# Patient Record
(demographics unavailable — no encounter records)

---

## 2018-03-05 NOTE — ED
Anna NEWBY Gabriel, scribed for Letitia Michel MD on 03/05/18 at 2210 .





Abdominal Pain/Male





- HPI Summary


HPI Summary: 





This patient is a 32 year old M presenting to [facility] with a chief complaint of 

ABD pain that began 10 days ago. The patient rates the pain 6/10 in severity 

and located in his suprapubic region. Patient reports blood in his stool, 

burning before urination, and lower back pain. Patient denies vomiting and 

difficulty moving bowels. Last BM was tonight





- History of Current Complaint


Chief Complaint: EDAbdPain


Stated Complaint: ABD PAIN


Time Seen by Provider: 03/05/18 21:47


Hx Obtained From: Patient


Onset/Duration: Still Present


Timing: Constant


Severity Initially: Moderate


Severity Currently: Moderate


Pain Intensity: 6


Pain Scale Used: 0-10 Numeric


Location: Suprapubic


Radiates: Yes


Radiates to: Back


Associated Signs And Symptoms: Positive: Other - blood in his stool, burning 

before urination, and lower back pain.  Negative: Constipation, Vomiting





- Allergies/Home Medications


Allergies/Adverse Reactions: 


 Allergies











Allergy/AdvReac Type Severity Reaction Status Date / Time


 


No Known Allergies Allergy   Verified 03/05/18 23:02














PMH/Surg Hx/FS Hx/Imm Hx


Endocrine/Hematology History: 


   Denies: Hx Anticoagulant Therapy, Hx Blood Disorders, Hx Bone Marrow Disease


Cardiovascular History: Reports: Hx Hypertension


   Denies: Hx Angioplasty


Respiratory History: 


   Denies: Hx Chronic Obstructive Pulmonary Disease (COPD)


GI History: 


   Denies: Hx Obstructive Bowel


Psychiatric History: Reports: Hx Attention Deficit Hyperactivity Disorder


Infectious Disease History: No


Infectious Disease History: 


   Denies: Traveled Outside the [place] in Last 30 Days





- Family History


Known Family History: Positive: Hypertension


   Negative: Cardiac Disease, Diabetes, Respiratory Disease, Seizure Disorder, 

Blood Disorder





- Social History


Occupation: Student


Alcohol Use: None


Hx Substance Use: No


Substance Use Type: Reports: None


Hx Tobacco Use: No


Smoking Status (MU): Never Smoked Tobacco





Review of Systems


Gastrointestinal: Negative - difficulty moving bowels 


Positive: Abdominal Pain, Other - blood in stool .  Negative: Vomiting


Positive: burning


Positive: Other - lower back pain 


All Other Systems Reviewed And Are Negative: Yes





Physical Exam





- Summary


Physical Exam Summary: 





VITAL SIGNS: Reviewed.


GENERAL:  Patient is a well-developed and nourished MALE who is lying 

comfortable in the stretcher. Patient is not in any acute respiratory distress.


HEAD AND FACE: No signs of trauma. No ecchymosis, hematomas or skull 

depressions. No sinus tenderness.


EYES: PERRLA, EOMI x 2, No injected conjunctiva, no nystagmus.


EARS: Hearing grossly intact. Ear canals and tympanic membranes are within 

normal limits.


MOUTH: Oropharynx within normal limits.


NECK: Supple, trachea is midline, no adenopathy, no JVD, no carotid bruit, no c-

spine tenderness, neck with full ROM.


CHEST: Symmetric, no tenderness at palpation


LUNGS: Clear to auscultation bilaterally. No wheezing or crackles.


CVS: Regular rate and rhythm, S1 and S2 present, no murmurs or gallops 

appreciated.


ABDOMEN: Soft, non-tender. No signs of distention. No rebound no guarding, and 

no masses palpated. Bowel sounds are hyperactive 


EXTREMITIES: FROM in all major joints, no edema, no cyanosis or clubbing.


NEURO: Alert and oriented x 3. No acute neurological deficits. Speech is normal 

and follows commands.


SKIN: Dry and warm





Triage Information Reviewed: Yes


Vital Signs On Initial Exam: 


 Initial Vitals











Temp Pulse Resp BP Pulse Ox


 


 98.6 F   67   18   128/78   100 


 


 03/05/18 21:30  03/05/18 21:30  03/05/18 21:30  03/05/18 21:30  03/05/18 21:30











Vital Signs Reviewed: Yes





Diagnostics





- Vital Signs


 Vital Signs











  Temp Pulse Resp BP Pulse Ox


 


 03/05/18 21:30  98.6 F  67  18  128/78  100














- Laboratory


Result Diagrams: 


 03/05/18 22:20





 03/05/18 22:20


Lab Statement: Any lab studies that have been ordered have been reviewed, and 

results considered in the medical decision making process.





- Radiology


  ** ABD xray


Radiology Interpretation Completed By: ED Physician - stool retention





Abdominal Pain Fem Course/Dx





- Course


Assessment/Plan: This patient is a 32 year old M presenting to [facility] with a 

chief complaint of ABD pain that began 10 days ago. The patient rates the pain 6

/10 in severity and located in his suprapubic region. Patient reports blood in 

his stool, burning before urination, and lower back pain. Patient denies 

vomiting and difficulty moving bowels. Last BM was tonight.  ABD XR reveals, 

stool retention.  Test results with no significant abnormalities. UA and 

bloodwork obtained.  Dx constipation.  Patient will be discharged and follow up 

from PCP.  The patient is agreeable with this plan.





- Diagnoses


Provider Diagnoses: 


 Constipation








Discharge





- Discharge Plan


Condition: Stable


Disposition: HOME


Referrals: 


Melba VICKERS,Sheryl EDMONDSON [Primary Care Provider] - 4 Days


Additional Instructions: 


Increase fiber intake. RETURN TO EMERGENCY DEPARTMENT FOR ANY NEW OR WORSENING 

SYMPTOMS 





The documentation as recorded by the Anna swanson Gabriel accurately reflects 

the service I personally performed and the decisions made by me, Letitia Michel MD.

## 2018-03-06 NOTE — RAD
INDICATION:  Abdominal pain.



COMPARISON:  There are no prior studies available for comparison.



TECHNIQUE: Supine and upright  views of the abdomen were obtained.



FINDINGS: The small bowel and colon appear nondistended. No free intraperitoneal air is

seen. There is a large amount retained stool present.



No abnormal calcifications are seen.



IMPRESSION:  NO EVIDENCE FOR ACUTE FINDING, LARGE AMOUNT RETAINED STOOL.